# Patient Record
Sex: FEMALE | Race: BLACK OR AFRICAN AMERICAN | Employment: FULL TIME | URBAN - METROPOLITAN AREA
[De-identification: names, ages, dates, MRNs, and addresses within clinical notes are randomized per-mention and may not be internally consistent; named-entity substitution may affect disease eponyms.]

---

## 2017-04-17 ENCOUNTER — OFFICE VISIT (OUTPATIENT)
Dept: OCCUPATIONAL MEDICINE | Age: 32
End: 2017-04-17
Attending: PHYSICIAN ASSISTANT

## 2018-02-06 ENCOUNTER — OFFICE VISIT (OUTPATIENT)
Dept: FAMILY MEDICINE CLINIC | Facility: CLINIC | Age: 33
End: 2018-02-06

## 2018-02-06 VITALS
SYSTOLIC BLOOD PRESSURE: 110 MMHG | HEIGHT: 66 IN | BODY MASS INDEX: 30.74 KG/M2 | OXYGEN SATURATION: 98 % | RESPIRATION RATE: 16 BRPM | TEMPERATURE: 98 F | WEIGHT: 191.25 LBS | HEART RATE: 90 BPM | DIASTOLIC BLOOD PRESSURE: 66 MMHG

## 2018-02-06 DIAGNOSIS — R21 RASH OF NECK: ICD-10-CM

## 2018-02-06 DIAGNOSIS — L83 ACANTHOSIS NIGRICANS: ICD-10-CM

## 2018-02-06 DIAGNOSIS — D22.9 CHANGE IN MULTIPLE NEVI: ICD-10-CM

## 2018-02-06 DIAGNOSIS — Z13.21 ENCOUNTER FOR VITAMIN DEFICIENCY SCREENING: ICD-10-CM

## 2018-02-06 DIAGNOSIS — Z00.00 LABORATORY EXAMINATION ORDERED AS PART OF A COMPLETE PHYSICAL EXAMINATION: ICD-10-CM

## 2018-02-06 DIAGNOSIS — Z01.419 ENCOUNTER FOR GYNECOLOGICAL EXAMINATION WITHOUT ABNORMAL FINDING: Primary | ICD-10-CM

## 2018-02-06 DIAGNOSIS — N76.1 SUBACUTE VAGINITIS: ICD-10-CM

## 2018-02-06 PROCEDURE — 99395 PREV VISIT EST AGE 18-39: CPT | Performed by: FAMILY MEDICINE

## 2018-02-06 PROCEDURE — 87480 CANDIDA DNA DIR PROBE: CPT | Performed by: FAMILY MEDICINE

## 2018-02-06 PROCEDURE — 87591 N.GONORRHOEAE DNA AMP PROB: CPT | Performed by: FAMILY MEDICINE

## 2018-02-06 PROCEDURE — 87624 HPV HI-RISK TYP POOLED RSLT: CPT | Performed by: FAMILY MEDICINE

## 2018-02-06 PROCEDURE — 87510 GARDNER VAG DNA DIR PROBE: CPT | Performed by: FAMILY MEDICINE

## 2018-02-06 PROCEDURE — 87491 CHLMYD TRACH DNA AMP PROBE: CPT | Performed by: FAMILY MEDICINE

## 2018-02-06 PROCEDURE — 87660 TRICHOMONAS VAGIN DIR PROBE: CPT | Performed by: FAMILY MEDICINE

## 2018-02-06 RX ORDER — CLOTRIMAZOLE AND BETAMETHASONE DIPROPIONATE 10; .64 MG/G; MG/G
CREAM TOPICAL
Qty: 45 G | Refills: 0 | Status: SHIPPED | OUTPATIENT
Start: 2018-02-06 | End: 2018-08-02

## 2018-02-06 NOTE — PROGRESS NOTES
Gwendolyn Burris is a 34year old female. HPI:  Pt is here for WWE/physical  Menses regular, q month, normal flow, 5 days, no intermenstrual spotting  Has new BF for one yr now  Using condoms regularly  Notes some greenish vag d/c for 2 months.   Thinks gaudencio good  SKIN: Multiple moles and skin tags on bilateral cheeks and one on anterior neck which patient states might be bigger than usual.  Has chronic darkening on her neck, left side seems irritated and itchy at times. Is more raised here.   No new soaps or abnormal finding  Reviewed diet/exercise/skin care/safety/safe sex/BSE/BMI goals  Reviewed immunizations: Advised on Tdap and annual flu vaccine. Patient defers. Thinks up-to-date with Tdap, done around 2015 for work related requirement.   Will check alina

## 2018-02-08 ENCOUNTER — TELEPHONE (OUTPATIENT)
Dept: FAMILY MEDICINE CLINIC | Facility: CLINIC | Age: 33
End: 2018-02-08

## 2018-02-08 DIAGNOSIS — B96.89 BV (BACTERIAL VAGINOSIS): Primary | ICD-10-CM

## 2018-02-08 DIAGNOSIS — N76.0 BV (BACTERIAL VAGINOSIS): Primary | ICD-10-CM

## 2018-02-08 LAB
C TRACH DNA SPEC QL NAA+PROBE: NEGATIVE
N GONORRHOEA DNA SPEC QL NAA+PROBE: NEGATIVE

## 2018-02-08 RX ORDER — METRONIDAZOLE 7.5 MG/G
1 GEL VAGINAL 2 TIMES DAILY
Qty: 1 TUBE | Refills: 0 | Status: SHIPPED | OUTPATIENT
Start: 2018-02-08 | End: 2018-02-13

## 2018-02-08 NOTE — TELEPHONE ENCOUNTER
Patient informed of test results per dr's directive and verbalized understanding, prescription was sent to pharmacy

## 2018-02-08 NOTE — TELEPHONE ENCOUNTER
----- Message from Maribell Curry MD sent at 2/8/2018  3:08 PM CST -----  Please notify pt Vaginitis Screen was positive for BV. Will treat with Metrogel Vaginal, 1 applicatorful qhs x 5 nights. Please send Rx to pharmacy.  Appointment if still with sympt

## 2018-02-09 LAB — HPV I/H RISK 1 DNA SPEC QL NAA+PROBE: NEGATIVE

## 2018-02-14 ENCOUNTER — TELEPHONE (OUTPATIENT)
Dept: FAMILY MEDICINE CLINIC | Facility: CLINIC | Age: 33
End: 2018-02-14

## 2018-02-14 DIAGNOSIS — N76.0 BV (BACTERIAL VAGINOSIS): ICD-10-CM

## 2018-02-14 DIAGNOSIS — B96.89 BV (BACTERIAL VAGINOSIS): ICD-10-CM

## 2018-02-14 NOTE — TELEPHONE ENCOUNTER
Lmtcb, when patient calls back will inform her pap was negative. Patient informed of test results per dr's directive and verbalized understanding

## 2018-02-15 RX ORDER — METRONIDAZOLE 7.5 MG/G
GEL VAGINAL
Qty: 70 G | Refills: 0 | OUTPATIENT
Start: 2018-02-15

## 2018-02-15 NOTE — TELEPHONE ENCOUNTER
Refused Prescriptions Disp Refills    METRONIDAZOLE 0.75 % Vaginal Gel [Pharmacy Med Name: METRONIDAZOLE 0.75% VAGINAL GEL 70G] 70 g 0      Sig: INSERT 1 APPLICATORFUL VAGINALLY TWICE DAILY        Refused By: Arelis Verma        Reason for Refusal: A

## 2018-08-02 PROBLEM — Z98.890 H/O LEEP: Status: ACTIVE | Noted: 2018-08-02

## 2018-08-02 PROCEDURE — 87660 TRICHOMONAS VAGIN DIR PROBE: CPT | Performed by: OBSTETRICS & GYNECOLOGY

## 2018-08-02 PROCEDURE — 87510 GARDNER VAG DNA DIR PROBE: CPT | Performed by: OBSTETRICS & GYNECOLOGY

## 2018-08-02 PROCEDURE — 87480 CANDIDA DNA DIR PROBE: CPT | Performed by: OBSTETRICS & GYNECOLOGY

## 2019-01-18 ENCOUNTER — OFFICE VISIT (OUTPATIENT)
Dept: FAMILY MEDICINE CLINIC | Facility: CLINIC | Age: 34
End: 2019-01-18
Payer: COMMERCIAL

## 2019-01-18 VITALS
RESPIRATION RATE: 18 BRPM | TEMPERATURE: 98 F | OXYGEN SATURATION: 98 % | WEIGHT: 192 LBS | DIASTOLIC BLOOD PRESSURE: 62 MMHG | HEIGHT: 66.5 IN | BODY MASS INDEX: 30.49 KG/M2 | SYSTOLIC BLOOD PRESSURE: 102 MMHG | HEART RATE: 96 BPM

## 2019-01-18 DIAGNOSIS — L91.8 SKIN TAG: ICD-10-CM

## 2019-01-18 DIAGNOSIS — N76.0 ACUTE VAGINITIS: ICD-10-CM

## 2019-01-18 DIAGNOSIS — L83 ACANTHOSIS NIGRICANS: ICD-10-CM

## 2019-01-18 DIAGNOSIS — Z01.419 ROUTINE GYNECOLOGICAL EXAMINATION: Primary | ICD-10-CM

## 2019-01-18 DIAGNOSIS — Z30.011 ENCOUNTER FOR INITIAL PRESCRIPTION OF CONTRACEPTIVE PILLS: ICD-10-CM

## 2019-01-18 PROCEDURE — 87660 TRICHOMONAS VAGIN DIR PROBE: CPT | Performed by: FAMILY MEDICINE

## 2019-01-18 PROCEDURE — 87510 GARDNER VAG DNA DIR PROBE: CPT | Performed by: FAMILY MEDICINE

## 2019-01-18 PROCEDURE — 99395 PREV VISIT EST AGE 18-39: CPT | Performed by: FAMILY MEDICINE

## 2019-01-18 PROCEDURE — 87591 N.GONORRHOEAE DNA AMP PROB: CPT | Performed by: FAMILY MEDICINE

## 2019-01-18 PROCEDURE — 87624 HPV HI-RISK TYP POOLED RSLT: CPT | Performed by: FAMILY MEDICINE

## 2019-01-18 PROCEDURE — 87480 CANDIDA DNA DIR PROBE: CPT | Performed by: FAMILY MEDICINE

## 2019-01-18 PROCEDURE — 87491 CHLMYD TRACH DNA AMP PROBE: CPT | Performed by: FAMILY MEDICINE

## 2019-01-18 RX ORDER — LEVONORGESTREL AND ETHINYL ESTRADIOL 0.1-0.02MG
1 KIT ORAL DAILY
Qty: 28 TABLET | Refills: 2 | Status: SHIPPED | OUTPATIENT
Start: 2019-01-18 | End: 2019-03-19

## 2019-01-18 NOTE — PROGRESS NOTES
Triston Pineda is a 28year old female. HPI:  Pt is here for routine physical  Menses usually very regular. Last 2 cycles were about 3 weeks apart. normal flow, lasts 5-7 days  No intermenstrual bleeding  Notes some vaginal/vulvar itching for about 2 weeks lesions or rashes  Stable skin tags on face  Has one skin tag/mole on ant neck that is sl bigger than previous and sometimes causes irritation   RESPIRATORY: denies shortness of breath with exertion, no cough  CARDIOVASCULAR: denies chest pain on exertion related requirement. Will check records. Understands indication for flu vaccine, but defers and understands risk of not being vaccinated.   PAP smear with HPV testing done today  Advised on imp of safe sex practices  Will go to lab fasting for routine BW

## 2019-01-20 LAB
C TRACH DNA SPEC QL NAA+PROBE: NEGATIVE
N GONORRHOEA DNA SPEC QL NAA+PROBE: NEGATIVE

## 2019-01-22 ENCOUNTER — OFFICE VISIT (OUTPATIENT)
Dept: FAMILY MEDICINE CLINIC | Facility: CLINIC | Age: 34
End: 2019-01-22
Payer: COMMERCIAL

## 2019-01-22 VITALS
SYSTOLIC BLOOD PRESSURE: 110 MMHG | DIASTOLIC BLOOD PRESSURE: 70 MMHG | RESPIRATION RATE: 16 BRPM | OXYGEN SATURATION: 98 % | HEIGHT: 66.5 IN | BODY MASS INDEX: 30.49 KG/M2 | TEMPERATURE: 98 F | WEIGHT: 192 LBS | HEART RATE: 84 BPM

## 2019-01-22 DIAGNOSIS — N76.0 BV (BACTERIAL VAGINOSIS): Primary | ICD-10-CM

## 2019-01-22 DIAGNOSIS — B96.89 BV (BACTERIAL VAGINOSIS): Primary | ICD-10-CM

## 2019-01-22 DIAGNOSIS — A59.01 TRICHOMONAS VAGINITIS: ICD-10-CM

## 2019-01-22 PROCEDURE — 99213 OFFICE O/P EST LOW 20 MIN: CPT | Performed by: FAMILY MEDICINE

## 2019-01-22 RX ORDER — METRONIDAZOLE 500 MG/1
500 TABLET ORAL 2 TIMES DAILY
Qty: 14 TABLET | Refills: 0 | Status: SHIPPED | OUTPATIENT
Start: 2019-01-22 | End: 2019-01-29

## 2019-01-22 NOTE — PROGRESS NOTES
Wojciech Maciel is a 28year old female. HPI:  Patient is here for follow-up on recent test results consistent with vaginal infections. Has been having some vaginal/vulvar itching on/off for a couple of weeks.   Did Monistat a few days ago, and not much [place] lb  02/06/18 : 191 lb 4 oz  10/04/16 : 182 lb 6 oz    GENERAL: well developed, well nourished,in no apparent distress, pleasant affect    ASSESSMENT AND PLAN:    1. BV (bacterial vaginosis)  2.  Trichomonas vaginitis  Reviewed test results with vaginitis sc

## 2019-01-23 LAB — HPV I/H RISK 1 DNA SPEC QL NAA+PROBE: NEGATIVE

## 2019-01-25 ENCOUNTER — PATIENT MESSAGE (OUTPATIENT)
Dept: FAMILY MEDICINE CLINIC | Facility: CLINIC | Age: 34
End: 2019-01-25

## 2019-01-25 NOTE — TELEPHONE ENCOUNTER
From: Cherlynn Opitz  Sent: 1/25/2019 12:51 PM CST  To: [facility] Clinical Staff  Subject: RE: information    ----- Message from [address] [place] [address] [address] Generic sent at 1/25/2019 12:51 PM CST -----    Rachel Larkin,    No problem. I appreciate you following up.  Did the pap results

## 2019-03-18 NOTE — TELEPHONE ENCOUNTER
Patient is    Levonorgestrel-Ethinyl Estrad 0.1-20 MG-MCG Oral Tab          Sig: Take 1 tablet by mouth daily.     Disp:  28 tablet    Refills:  2    Start: 3/18/2019    Class: Normal    Non-formulary    Last ordered: 2 months ago by Bibi Benedict MD Telephone Encounter by Maggie Colon at 09/19/17 10:09 AM     Author:  Maggie Colon Service:  (none) Author Type:  Patient      Filed:  09/19/17 10:10 AM Encounter Date:  4/10/2017 Status:  Signed     :  Maggie Colon (Patient )            Benefits summary received from [employer].  Primary: Benefits subject to a $183 deductible ($[CC1.1T]183[CC1.1M] met) and 20% co-insurance for the administration and cost of Prolia. NO PA required.      Secondary:[CC1.1T] This is a Medicare Supplement Plan F and it covers the Medicare Part B deductible, co-insurance and 100% of the excess charges.[CC1.1M]     If pt is wishes to contact insurance to double check what their out of pocket portion will be, here is the medication code.  Prolia med [CC1.1T]          Revision History        User Key Date/Time User Provider Type Action    > CC1.1 09/19/17 10:10 AM Maggie Colon Patient  Sign    M - Manual, T - Template

## 2019-03-19 RX ORDER — LEVONORGESTREL AND ETHINYL ESTRADIOL 0.1-0.02MG
1 KIT ORAL DAILY
Qty: 84 TABLET | Refills: 1 | Status: SHIPPED | OUTPATIENT
Start: 2019-03-19 | End: 2020-08-18

## 2019-05-08 ENCOUNTER — TELEPHONE (OUTPATIENT)
Dept: FAMILY MEDICINE CLINIC | Facility: CLINIC | Age: 34
End: 2019-05-08

## 2019-05-08 DIAGNOSIS — R21 RASH OF NECK: ICD-10-CM

## 2019-05-08 NOTE — TELEPHONE ENCOUNTER
Patient was in with her son, this is the prescription she needs to be sent to her pharmacy:    [address],  [address] [facility] [address], [phone], [phone]

## 2019-05-09 RX ORDER — CLOTRIMAZOLE AND BETAMETHASONE DIPROPIONATE 10; .64 MG/G; MG/G
CREAM TOPICAL
Qty: 45 G | Refills: 0 | Status: SHIPPED | OUTPATIENT
Start: 2019-05-09

## 2019-05-09 NOTE — TELEPHONE ENCOUNTER
Patient states she is having recurrence of rash on her neck. Responded well to Lotrisone cream in the past medication refilled. Appointment if symptoms persist or worsen.

## 2020-08-18 ENCOUNTER — PATIENT MESSAGE (OUTPATIENT)
Dept: FAMILY MEDICINE CLINIC | Facility: CLINIC | Age: 35
End: 2020-08-18

## 2020-08-18 ENCOUNTER — OFFICE VISIT (OUTPATIENT)
Dept: FAMILY MEDICINE CLINIC | Facility: CLINIC | Age: 35
End: 2020-08-18
Payer: COMMERCIAL

## 2020-08-18 VITALS
OXYGEN SATURATION: 98 % | WEIGHT: 193 LBS | RESPIRATION RATE: 18 BRPM | HEIGHT: 66.5 IN | TEMPERATURE: 99 F | DIASTOLIC BLOOD PRESSURE: 58 MMHG | HEART RATE: 57 BPM | BODY MASS INDEX: 30.65 KG/M2 | SYSTOLIC BLOOD PRESSURE: 108 MMHG

## 2020-08-18 DIAGNOSIS — Z00.00 LABORATORY EXAMINATION ORDERED AS PART OF A COMPLETE PHYSICAL EXAMINATION: ICD-10-CM

## 2020-08-18 DIAGNOSIS — N60.11 FIBROCYSTIC BREAST CHANGES OF BOTH BREASTS: ICD-10-CM

## 2020-08-18 DIAGNOSIS — Z11.4 ENCOUNTER FOR SCREENING FOR HIV: ICD-10-CM

## 2020-08-18 DIAGNOSIS — Z23 NEED FOR TDAP VACCINATION: ICD-10-CM

## 2020-08-18 DIAGNOSIS — Z86.19 HISTORY OF TRICHOMONAL VAGINITIS: ICD-10-CM

## 2020-08-18 DIAGNOSIS — L20.82 FLEXURAL ECZEMA: ICD-10-CM

## 2020-08-18 DIAGNOSIS — N60.12 FIBROCYSTIC BREAST CHANGES OF BOTH BREASTS: ICD-10-CM

## 2020-08-18 DIAGNOSIS — L91.8 CUTANEOUS SKIN TAGS: ICD-10-CM

## 2020-08-18 DIAGNOSIS — N63.10 BREAST MASS, RIGHT: ICD-10-CM

## 2020-08-18 DIAGNOSIS — Z01.419 ROUTINE GYNECOLOGICAL EXAMINATION: Primary | ICD-10-CM

## 2020-08-18 DIAGNOSIS — Z80.3 FAMILY HISTORY OF BREAST CANCER: ICD-10-CM

## 2020-08-18 PROCEDURE — 3078F DIAST BP <80 MM HG: CPT | Performed by: FAMILY MEDICINE

## 2020-08-18 PROCEDURE — 87660 TRICHOMONAS VAGIN DIR PROBE: CPT | Performed by: FAMILY MEDICINE

## 2020-08-18 PROCEDURE — 87480 CANDIDA DNA DIR PROBE: CPT | Performed by: FAMILY MEDICINE

## 2020-08-18 PROCEDURE — 90471 IMMUNIZATION ADMIN: CPT | Performed by: FAMILY MEDICINE

## 2020-08-18 PROCEDURE — 87491 CHLMYD TRACH DNA AMP PROBE: CPT | Performed by: FAMILY MEDICINE

## 2020-08-18 PROCEDURE — 87510 GARDNER VAG DNA DIR PROBE: CPT | Performed by: FAMILY MEDICINE

## 2020-08-18 PROCEDURE — 3074F SYST BP LT 130 MM HG: CPT | Performed by: FAMILY MEDICINE

## 2020-08-18 PROCEDURE — 87624 HPV HI-RISK TYP POOLED RSLT: CPT | Performed by: FAMILY MEDICINE

## 2020-08-18 PROCEDURE — 99395 PREV VISIT EST AGE 18-39: CPT | Performed by: FAMILY MEDICINE

## 2020-08-18 PROCEDURE — 87591 N.GONORRHOEAE DNA AMP PROB: CPT | Performed by: FAMILY MEDICINE

## 2020-08-18 PROCEDURE — 90715 TDAP VACCINE 7 YRS/> IM: CPT | Performed by: FAMILY MEDICINE

## 2020-08-18 PROCEDURE — 3008F BODY MASS INDEX DOCD: CPT | Performed by: FAMILY MEDICINE

## 2020-08-18 PROCEDURE — 87625 HPV TYPES 16 & 18 ONLY: CPT | Performed by: FAMILY MEDICINE

## 2020-08-18 NOTE — TELEPHONE ENCOUNTER
From: Jose Carlos Rushing  To: Karyle Favorite, MD  Sent: 8/18/2020 3:35 PM CDT  Subject: Prescription Question    Hi Dr Sy Ramirez! It was great seeing you today! I have a question that I forgot to ask before I left the office.  Can you send another prescrip

## 2020-08-18 NOTE — PROGRESS NOTES
Dharmesh Samuel is a 27year old female. HPI:  Pt is here for routine physical  Menses regular but shorter in duration in last few months. Used to be 5-7 days long, now about 3-4 days long.   Day 2 seems to be heavier but with this cycle flow was normal.   N Use      Smoking status: Never Smoker      Smokeless tobacco: Never Used      Tobacco comment: No tobacco use    Alcohol use:  Yes      Alcohol/week: 0.0 standard drinks    Drug use: No  Single                REVIEW OF SYSTEMS:  GENERAL HEALTH: feels well o masses, no HSM, ND  EXTREMITIES: no cyanosis, clubbing or edema  NEURO: A&O x3, no focal deficits  Normal gait  Breast exam: symmetric, bilateral dense fibrocystic changes noted.   Right upper breast, about 12:00 aspect of breast edge with 1 cm mobile mass ACELLULAR PERTUSIS VACCINE (TDAP), >7 YEARS, IM USE    5. History of trichomonal vaginitis  - VAGINITIS/VAGINOSIS, DNA PROBE    6. Cutaneous skin tags  Clinically benign. Discussed option for removal of larger lesion on anterior neck.   Patient will consid

## 2020-08-19 RX ORDER — LEVONORGESTREL AND ETHINYL ESTRADIOL 0.1-0.02MG
1 KIT ORAL DAILY
Qty: 28 TABLET | Refills: 1 | Status: SHIPPED | OUTPATIENT
Start: 2020-08-19 | End: 2020-08-20

## 2020-08-20 ENCOUNTER — TELEPHONE (OUTPATIENT)
Dept: FAMILY MEDICINE CLINIC | Facility: CLINIC | Age: 35
End: 2020-08-20

## 2020-08-20 LAB
C TRACH DNA SPEC QL NAA+PROBE: NEGATIVE
N GONORRHOEA DNA SPEC QL NAA+PROBE: NEGATIVE

## 2020-08-20 RX ORDER — LEVONORGESTREL AND ETHINYL ESTRADIOL 0.1-0.02MG
KIT ORAL
Qty: 84 TABLET | Refills: 0 | OUTPATIENT
Start: 2020-08-20

## 2020-08-20 RX ORDER — LEVONORGESTREL AND ETHINYL ESTRADIOL 0.1-0.02MG
1 KIT ORAL DAILY
Qty: 84 TABLET | Refills: 0 | Status: SHIPPED | OUTPATIENT
Start: 2020-08-20 | End: 2020-11-15

## 2020-08-20 NOTE — TELEPHONE ENCOUNTER
Levonorgestrel-Ethinyl Estrad 0.1-20 MG-MCG Oral Tab       2 month supply was sent to the pharmacy however the insurance requires 3 months at a time.

## 2020-08-20 NOTE — TELEPHONE ENCOUNTER
LOV 8/18/2020    LAST LAB    LAST RX    Next OV No future appointments.     PROTOCOL    Name from pharmacy: Monik Jasminetner 0.1MG/0.02MG TABS 28S          Will file in chart as: VIENVA 0.1-20 MG-MCG Oral Tab         Sig: TAKE 1 TABLET BY MOUTH DAILY    Disp:  84 tab

## 2020-08-20 NOTE — TELEPHONE ENCOUNTER
Would you be agreeable to sending for 3 months vs the 2 month fill?      Per last office visit note 8/18/2020

## 2020-08-20 NOTE — TELEPHONE ENCOUNTER
Called pt to inform per PCP ok for 3 months supply of Levonorgestrel-Ethinyl Estrad 0.1-20 MG-MCG- sent to [address] listed. Advised to RTC for f/u in 6-8 weeks after starting OCP- scheduled OV on 10/6 as requested.  No further questions or concern

## 2020-08-21 LAB
HPV I/H RISK 1 DNA SPEC QL NAA+PROBE: POSITIVE
HPV16 DNA CVX QL PROBE+SIG AMP: NEGATIVE
HPV18 DNA CVX QL PROBE+SIG AMP: NEGATIVE

## 2020-09-18 ENCOUNTER — E-VISIT (OUTPATIENT)
Dept: TELEHEALTH | Age: 35
End: 2020-09-18

## 2020-09-18 DIAGNOSIS — Z02.9 ENCOUNTERS FOR ADMINISTRATIVE PURPOSE: Primary | ICD-10-CM

## 2020-09-19 ENCOUNTER — HOSPITAL ENCOUNTER (OUTPATIENT)
Age: 35
Discharge: HOME | End: 2020-09-19
Payer: COMMERCIAL

## 2020-09-19 VITALS
OXYGEN SATURATION: 100 % | HEART RATE: 94 BPM | DIASTOLIC BLOOD PRESSURE: 68 MMHG | SYSTOLIC BLOOD PRESSURE: 112 MMHG | RESPIRATION RATE: 16 BRPM | TEMPERATURE: 98 F

## 2020-09-19 DIAGNOSIS — L05.01 PILONIDAL CYST WITH ABSCESS: Primary | ICD-10-CM

## 2020-09-19 DIAGNOSIS — L03.317 CELLULITIS OF BUTTOCK: ICD-10-CM

## 2020-09-19 PROCEDURE — 87205 SMEAR GRAM STAIN: CPT | Performed by: PHYSICIAN ASSISTANT

## 2020-09-19 PROCEDURE — 87070 CULTURE OTHR SPECIMN AEROBIC: CPT | Performed by: PHYSICIAN ASSISTANT

## 2020-09-19 PROCEDURE — 10081 I&D PILONIDAL CYST COMP: CPT

## 2020-09-19 PROCEDURE — 99204 OFFICE O/P NEW MOD 45 MIN: CPT

## 2020-09-19 PROCEDURE — 99214 OFFICE O/P EST MOD 30 MIN: CPT

## 2020-09-19 PROCEDURE — 87077 CULTURE AEROBIC IDENTIFY: CPT | Performed by: PHYSICIAN ASSISTANT

## 2020-09-19 RX ORDER — CLINDAMYCIN HYDROCHLORIDE 300 MG/1
300 CAPSULE ORAL 3 TIMES DAILY
Qty: 30 CAPSULE | Refills: 0 | Status: SHIPPED | OUTPATIENT
Start: 2020-09-19 | End: 2020-09-29

## 2020-09-19 RX ORDER — HYDROCODONE BITARTRATE AND ACETAMINOPHEN 5; 325 MG/1; MG/1
1 TABLET ORAL EVERY 6 HOURS PRN
Qty: 6 TABLET | Refills: 0 | Status: SHIPPED | OUTPATIENT
Start: 2020-09-19 | End: 2020-10-07

## 2020-09-19 NOTE — ED PROVIDER NOTES
Patient Seen in: [facility]      History   Patient presents with:  Redness  Anal Problem    Stated Complaint: BOIL LOWER BACK X 1 WK    HPI    23-year-old female who states that approximately 1 week ago she started getting pain Vitals signs and nursing note reviewed. Constitutional:       General: She is not in acute distress. Appearance: She is well-developed. She is not diaphoretic. HENT:      Head: Normocephalic and atraumatic.    Neck:      Musculoskeletal: Normal rang The patient abscess was located right buttock/pilonidal.   I obtained verbal consent from the patient to drain the abscess who was informed about the possibility of bleeding, pain and worsening of the condition. Area was anesthetized with 1% lidocaine.  The - as instructed. The patient verbalized understanding of the discharge instructions and plan.

## 2020-09-19 NOTE — ED INITIAL ASSESSMENT (HPI)
Patient presents to IC with ml of painful boil x one week which she states started last Saturday at the top of her butt crack but now the area of induration has spread to right and left buttocks. No drainage noted. No fever. States previous boil about a year

## 2020-09-22 ENCOUNTER — HOSPITAL ENCOUNTER (OUTPATIENT)
Age: 35
Discharge: HOME | End: 2020-09-22
Payer: COMMERCIAL

## 2020-09-22 VITALS
DIASTOLIC BLOOD PRESSURE: 72 MMHG | SYSTOLIC BLOOD PRESSURE: 126 MMHG | BODY MASS INDEX: 27.97 KG/M2 | TEMPERATURE: 98 F | HEIGHT: 66 IN | OXYGEN SATURATION: 100 % | HEART RATE: 78 BPM | RESPIRATION RATE: 20 BRPM | WEIGHT: 174 LBS

## 2020-09-22 DIAGNOSIS — Z48.00 ABSCESS PACKING REMOVAL: ICD-10-CM

## 2020-09-22 DIAGNOSIS — Z09 ENCOUNTER FOR RECHECK OF ABSCESS FOLLOWING INCISION AND DRAINAGE: Primary | ICD-10-CM

## 2020-09-22 PROCEDURE — 99211 OFF/OP EST MAY X REQ PHY/QHP: CPT

## 2020-09-22 NOTE — ED NOTES
Called patient and told her to finish clindamycin and to f/u with bbic or/and surgeon. She verbalized understanding.

## 2020-09-22 NOTE — ED INITIAL ASSESSMENT (HPI)
Here for recheck of abscess that was drained on the 19th. Patient sts that it is draining, changes dressings BID. On review of faxed information no ultrasound or labs were included.

## 2020-09-22 NOTE — ED PROVIDER NOTES
Patient Seen in: [address] [facility]      History   Patient presents with:  Recheck    Stated Complaint: Follow up (Asbcess)    HPI  26 yo female presents for packing removal after pilonidal abscess drainage 9/19/20.   She is on Clindamyc well-developed. She is not ill-appearing or toxic-appearing. HENT:      Head: Normocephalic and atraumatic. Cardiovascular:      Rate and Rhythm: Normal rate.    Pulmonary:      Effort: Pulmonary effort is normal.   Skin:     General: Skin is warm and d

## 2020-09-23 ENCOUNTER — OFFICE VISIT (OUTPATIENT)
Dept: SURGERY | Facility: CLINIC | Age: 35
End: 2020-09-23
Payer: COMMERCIAL

## 2020-09-23 VITALS
HEIGHT: 66 IN | HEART RATE: 73 BPM | BODY MASS INDEX: 31.5 KG/M2 | TEMPERATURE: 98 F | SYSTOLIC BLOOD PRESSURE: 121 MMHG | DIASTOLIC BLOOD PRESSURE: 79 MMHG | WEIGHT: 196 LBS

## 2020-09-23 DIAGNOSIS — L05.01 PILONIDAL CYST WITH ABSCESS: Primary | ICD-10-CM

## 2020-09-23 PROCEDURE — 3078F DIAST BP <80 MM HG: CPT | Performed by: SURGERY

## 2020-09-23 PROCEDURE — 3008F BODY MASS INDEX DOCD: CPT | Performed by: SURGERY

## 2020-09-23 PROCEDURE — 99243 OFF/OP CNSLTJ NEW/EST LOW 30: CPT | Performed by: SURGERY

## 2020-09-23 PROCEDURE — 3074F SYST BP LT 130 MM HG: CPT | Performed by: SURGERY

## 2020-09-23 NOTE — H&P
New Patient Visit Note       Active Problems      1.  Pilonidal cyst with abscess        Chief Complaint   Patient presents with:  Pilonidal Cyst: New Patient Exam ref by Dr. Jeffrey Evangelista for Pilonidal Cyst - Pt states packing was removed yesterday at [facility] in [facility] grandparents   • Diabetes Other         Maternal grandparent   • Heart Disease Other         CVA/Maternal grandparent   • Hypertension Other         Maternal grandprent   • Breast Cancer Maternal Aunt 52   • Diabetes Maternal Grandmother    • High Blood Pr palpitations and leg swelling. Gastrointestinal: Negative for abdominal distention, abdominal pain, anal bleeding, blood in stool, constipation, diarrhea, nausea and vomiting.    Genitourinary: Negative for difficulty urinating, dysuria, frequency and urg normal mood and affect. Her speech is normal and behavior is normal. Judgment and thought content normal.   Nursing note and vitals reviewed.           Assessment   Pilonidal cyst with abscess  (primary encounter diagnosis)      Plan     · The patient has a

## 2020-10-07 ENCOUNTER — OFFICE VISIT (OUTPATIENT)
Dept: SURGERY | Facility: CLINIC | Age: 35
End: 2020-10-07
Payer: COMMERCIAL

## 2020-10-07 VITALS
RESPIRATION RATE: 16 BRPM | DIASTOLIC BLOOD PRESSURE: 84 MMHG | HEART RATE: 71 BPM | TEMPERATURE: 98 F | BODY MASS INDEX: 32 KG/M2 | SYSTOLIC BLOOD PRESSURE: 117 MMHG | WEIGHT: 196 LBS

## 2020-10-07 DIAGNOSIS — L05.01 PILONIDAL CYST WITH ABSCESS: ICD-10-CM

## 2020-10-07 DIAGNOSIS — Z01.818 PRE-OP TESTING: Primary | ICD-10-CM

## 2020-10-07 PROCEDURE — 3074F SYST BP LT 130 MM HG: CPT | Performed by: SURGERY

## 2020-10-07 PROCEDURE — 3079F DIAST BP 80-89 MM HG: CPT | Performed by: SURGERY

## 2020-10-07 PROCEDURE — 99212 OFFICE O/P EST SF 10 MIN: CPT | Performed by: SURGERY

## 2020-10-07 NOTE — PROGRESS NOTES
Follow Up Visit Note       Active Problems      1. Pre-op testing    2. Pilonidal cyst with abscess          Chief Complaint   Patient presents with:  Pilonidal Cyst: 1 week pilonidal cyst wound recheck. Pt states 'area is better, still firm but area less. grandprent   • Breast Cancer Maternal Aunt 52   • Diabetes Maternal Grandmother    • High Blood Pressure Maternal Grandmother    • Prostate Cancer Maternal Grandfather    • Colon Cancer Maternal Grandfather      Social History    Socioeconomic History Hematological: Negative for adenopathy. Does not bruise/bleed easily. Psychiatric/Behavioral: Negative for behavioral problems and sleep disturbance.         Physical Findings   /84   Pulse 71   Temp 98.2 °F (36.8 °C)   Resp 16   Wt 196 lb (88.9 k

## 2020-11-16 RX ORDER — LEVONORGESTREL AND ETHINYL ESTRADIOL 0.1-0.02MG
1 KIT ORAL DAILY
Qty: 84 TABLET | Refills: 0 | Status: SHIPPED | OUTPATIENT
Start: 2020-11-16 | End: 2021-02-11

## 2020-11-16 RX ORDER — LEVONORGESTREL AND ETHINYL ESTRADIOL 0.1-0.02MG
KIT ORAL
Qty: 84 TABLET | Refills: 0 | OUTPATIENT
Start: 2020-11-16

## 2020-11-16 NOTE — TELEPHONE ENCOUNTER
LOV 8/18/2020    LAST LAB 8/18/2020     LAST RX   Levonorgestrel-Ethinyl Estrad 0.1-20 MG-MCG Oral Tab 84 tablet 0 8/20/2020         Next OV No future appointments. PROTOCOL passed.

## 2020-12-02 ENCOUNTER — TELEPHONE (OUTPATIENT)
Dept: SURGERY | Facility: CLINIC | Age: 35
End: 2020-12-02

## 2021-02-11 RX ORDER — LEVONORGESTREL AND ETHINYL ESTRADIOL 0.1-0.02MG
1 KIT ORAL DAILY
Qty: 84 TABLET | Refills: 0 | Status: SHIPPED | OUTPATIENT
Start: 2021-02-11

## 2021-02-11 NOTE — TELEPHONE ENCOUNTER
LOV 8/18/2020    LAST LAB 8/18/2020    LAST RX   Levonorgestrel-Ethinyl Estrad 0.1-20 MG-MCG Oral Tab 84 tablet 0 11/16/2020         Next OV No future appointments.       PROTOCOL passed

## 2021-02-16 RX ORDER — LEVONORGESTREL AND ETHINYL ESTRADIOL 0.1-0.02MG
1 KIT ORAL DAILY
Qty: 84 TABLET | Refills: 0 | OUTPATIENT
Start: 2021-02-16

## 2021-02-16 NOTE — TELEPHONE ENCOUNTER
Levonorgestrel-Ethinyl Estrad 0.1-20 MG-MCG Oral Tab 84 tablet 0 2/11/2021        DENIED AS DUPLICATE, INSTRUCTIONS TO PHARMACY TO CHECK FOR NEW/ REFILLS

## 2023-01-18 NOTE — LETTER
20    Patient: Olga Raygoza  : 1985 Visit date: 2020    Dear  Justo Galvan MD    Thank you for referring Olga Raygoza to my practice. Please find my assessment and plan below.        Assessment   Pilonidal cyst with abscess  (mata [de-identified] : Constitutional: The general appearance of the patient is well developed, well nourished, no deformities and well groomed. Normal \par \par Gait: Gait and function is as follows: normal gait. \par \par Skin: Head and neck visualized skin is normal. Left upper extremity visualized skin is normal. Right upper extremity visualized skin is normal. Thoracic Skin of the thoracic spine shows visualized skin is normal. \par \par Cardiovascular: palpable radial pulse bilaterally, good capillary refill in digits of the bilateral upper extremities and no temperature or color changes in the bilateral upper extremities. \par \par Lymphatic: Normal Palpation of lymph nodes in the cervical. \par \par Neurologic: fine motor control in the bilateral upper extremities is intact. Deep Tendon Reflexes in Upper and Lower Extremities Negative Cramer's in the bilateral upper extremities. The patient is oriented to time, place and person. Sensation to light touch intact in the bilateral upper extremities. Mood and Affect is normal. \par \par Right Shoulder: Inspection of the shoulder/upper arm is as follows: no scapula winging, no biceps deformity and no AC joint deformity. Palpation of the shoulder/upper arm is as follows: There is tenderness at the proximal biceps tendon. Range of motion of the shoulder is as follows: Pain with internal rotation, external rotation, abduction and forward flexion. Strength of the shoulder is as follows: Supraspinatus 4/5. External Rotation 4/5. Internal Rotation 4/5. Deltoid 5/5 Ligament Stability and Special Tests of the shoulder is as follows: Neer test is positive. Duffy' test is positive. Speed's test is positive. \par \par Left Shoulder: Inspection of the shoulder/upper arm is as follows: There is a full, pain-free, stable range of motion of the shoulder with normal strength and no tenderness to palpation. \par \par Neck: \par Inspection / Palpation of the cervical spine is as follows: mild paracervical tenderness. Range of motion of the cervical spine is as follows: moderately decreased range of motion of the cervical spine. Stability testing for the cervical spine is as follows Stable range of motion. \par \par Back, including spine: Inspection / Palpation of the thoracic/lumbar spine is as follows: There is a full, pain free, stable range of motion of the thoracic spine with a normal tone and not tenderness to palpation..\par

## 2023-11-17 ENCOUNTER — TELEPHONE (OUTPATIENT)
Dept: FAMILY MEDICINE CLINIC | Facility: CLINIC | Age: 38
End: 2023-11-17

## 2023-11-19 ENCOUNTER — PATIENT OUTREACH (OUTPATIENT)
Dept: CASE MANAGEMENT | Age: 38
End: 2023-11-19

## 2023-11-19 NOTE — PROCEDURES
The office order for PCP removal request is Approved and finalized on November 19, 2023.     Thanks,  [facility] [employer]

## (undated) NOTE — LETTER
10/07/20    Patient: Kacey Moon  : 1985 Visit date: 10/7/2020    Dear  Nela Hugo MD    Thank you for referring Rossyclaudy Red to my practice. Please find my assessment and plan below.     Assessment   Pre-op testing  (primary encounter d

## (undated) NOTE — LETTER
09/17/20        [place]  [address] [facility] [id number]      Dear Odessa Dance,    Our records indicate that you have outstanding lab work and or testing that was ordered for you and has not yet been completed:  Orders Placed This Encounter

## (undated) NOTE — LETTER
03/27/18        [address] [address]      Dear Cece Alcaraz,    [facility] records indicate that you have outstanding lab work and or testing that was ordered for you and has not yet been completed:            CBC W Differential W Jaquelin